# Patient Record
Sex: FEMALE | Race: WHITE | NOT HISPANIC OR LATINO | URBAN - METROPOLITAN AREA
[De-identification: names, ages, dates, MRNs, and addresses within clinical notes are randomized per-mention and may not be internally consistent; named-entity substitution may affect disease eponyms.]

---

## 2018-03-20 ENCOUNTER — OFFICE (OUTPATIENT)
Dept: URBAN - METROPOLITAN AREA CLINIC 33 | Facility: CLINIC | Age: 53
End: 2018-03-20

## 2018-03-20 VITALS
HEIGHT: 60 IN | WEIGHT: 219 LBS | DIASTOLIC BLOOD PRESSURE: 75 MMHG | SYSTOLIC BLOOD PRESSURE: 130 MMHG | HEART RATE: 89 BPM | TEMPERATURE: 97.2 F

## 2018-03-20 DIAGNOSIS — Z98.84 BARIATRIC SURGERY STATUS: ICD-10-CM

## 2018-03-20 DIAGNOSIS — K59.09 OTHER CONSTIPATION: ICD-10-CM

## 2018-03-20 DIAGNOSIS — K62.5 HEMORRHAGE OF ANUS AND RECTUM: ICD-10-CM

## 2018-03-20 PROCEDURE — 99244 OFF/OP CNSLTJ NEW/EST MOD 40: CPT

## 2018-03-20 NOTE — SERVICEHPINOTES
CHATA VALDIVIA   is a   52   year old    female who is being seen in consultation at the request of   PARAG LIMA   for first time colonoscopy and intermittent rectal bleeding. Pt reports bright red bleeding and seeing clots about 1x/month. She notes painful sensation that burns and comes on with constipation but resolved right after defecation. Has BMs 1-2x/day, BSS type 1 to 7, and tends to be "thin and flat" going on for past year. Denies chest pain, SOB, weight loss, decrease in appetite, melena.  Pt had Lap Band at [facility] 2010 and lost 10 lbs after procedure which she gained back eventually. She reports no complications with the Lap Band, but has noticed that spicy foods upset her stomach more than prior to this surgery. Denies reflux, heart burn, chest pain, cough, dysphagia, vomiting, nausea. She has hx iron deficiency followed by PCP Dr Lima. Pt uses NSAIDs rarely for lower back pain or headaches prn. No tobacco, ETOH, drug use. No prior colonoscopyBROSA tx with CPAP and DM controlled with Metformin. BRNo hx cardiovascular diseasesBRNo fm hx colon cancer

## 2018-04-26 ENCOUNTER — OFFICE (OUTPATIENT)
Dept: URBAN - METROPOLITAN AREA PATHOLOGY 17 | Facility: PATHOLOGY | Age: 53
End: 2018-04-26
Payer: COMMERCIAL

## 2018-04-26 ENCOUNTER — OFFICE (OUTPATIENT)
Dept: URBAN - METROPOLITAN AREA CLINIC 32 | Facility: CLINIC | Age: 53
End: 2018-04-26
Payer: COMMERCIAL

## 2018-04-26 VITALS
RESPIRATION RATE: 18 BRPM | SYSTOLIC BLOOD PRESSURE: 167 MMHG | SYSTOLIC BLOOD PRESSURE: 150 MMHG | SYSTOLIC BLOOD PRESSURE: 147 MMHG | OXYGEN SATURATION: 97 % | RESPIRATION RATE: 17 BRPM | DIASTOLIC BLOOD PRESSURE: 101 MMHG | HEART RATE: 107 BPM | RESPIRATION RATE: 15 BRPM | HEART RATE: 93 BPM | OXYGEN SATURATION: 98 % | HEART RATE: 98 BPM | RESPIRATION RATE: 16 BRPM | HEART RATE: 101 BPM | TEMPERATURE: 98.1 F | SYSTOLIC BLOOD PRESSURE: 160 MMHG | SYSTOLIC BLOOD PRESSURE: 183 MMHG | DIASTOLIC BLOOD PRESSURE: 92 MMHG | DIASTOLIC BLOOD PRESSURE: 96 MMHG | HEIGHT: 60 IN | OXYGEN SATURATION: 100 % | HEART RATE: 87 BPM | WEIGHT: 219 LBS | RESPIRATION RATE: 23 BRPM | DIASTOLIC BLOOD PRESSURE: 81 MMHG | TEMPERATURE: 97.7 F | DIASTOLIC BLOOD PRESSURE: 82 MMHG | SYSTOLIC BLOOD PRESSURE: 166 MMHG | SYSTOLIC BLOOD PRESSURE: 144 MMHG | HEART RATE: 90 BPM | RESPIRATION RATE: 14 BRPM | DIASTOLIC BLOOD PRESSURE: 104 MMHG | HEART RATE: 89 BPM | DIASTOLIC BLOOD PRESSURE: 97 MMHG | OXYGEN SATURATION: 99 % | SYSTOLIC BLOOD PRESSURE: 163 MMHG

## 2018-04-26 DIAGNOSIS — D12.2 BENIGN NEOPLASM OF ASCENDING COLON: ICD-10-CM

## 2018-04-26 DIAGNOSIS — Z12.11 ENCOUNTER FOR SCREENING FOR MALIGNANT NEOPLASM OF COLON: ICD-10-CM

## 2018-04-26 PROBLEM — K64.8 OTHER HEMORRHOIDS: Status: ACTIVE | Noted: 2018-04-26

## 2018-04-26 PROBLEM — K64.4 RESIDUAL HEMORRHOIDAL SKIN TAGS: Status: ACTIVE | Noted: 2018-04-26

## 2018-04-26 PROBLEM — D12.0 BENIGN NEOPLASM OF CECUM: Status: ACTIVE | Noted: 2018-04-26

## 2018-04-26 PROBLEM — K57.30 DVRTCLOS OF LG INT W/O PERFORATION OR ABSCESS W/O BLEEDING: Status: ACTIVE | Noted: 2018-04-26

## 2018-04-26 PROCEDURE — 88305 TISSUE EXAM BY PATHOLOGIST: CPT

## 2018-04-26 RX ADMIN — LIDOCAINE HYDROCHLORIDE 60 MG: 20 INJECTION, SOLUTION INFILTRATION; PERINEURAL at 09:46

## 2022-01-24 ENCOUNTER — NEW PATIENT (OUTPATIENT)
Age: 57
End: 2022-01-24

## 2022-01-24 DIAGNOSIS — Z96.1: ICD-10-CM

## 2022-01-24 DIAGNOSIS — H53.032: ICD-10-CM

## 2022-01-24 DIAGNOSIS — H26.493: ICD-10-CM

## 2022-01-24 DIAGNOSIS — H50.00: ICD-10-CM

## 2022-01-24 DIAGNOSIS — Z98.890: ICD-10-CM

## 2022-01-24 PROCEDURE — 99204 OFFICE O/P NEW MOD 45 MIN: CPT

## 2022-01-24 ASSESSMENT — KERATOMETRY
OS_AXISANGLE_DEGREES: 179
OS_AXISANGLE2_DEGREES: 89
OD_K1POWER_DIOPTERS: 46.50
OD_AXISANGLE2_DEGREES: 111
OS_K1POWER_DIOPTERS: 46.50
OD_K2POWER_DIOPTERS: 47.50
OS_K2POWER_DIOPTERS: 47.75
OD_AXISANGLE_DEGREES: 21

## 2022-01-24 ASSESSMENT — VISUAL ACUITY
OD_SC: 20/30-2
OD_GLARE: 20/40
OS_PH: 20/60+2
OS_GLARE: 20/200
OS_SC: 20/70-1

## 2022-01-24 ASSESSMENT — TONOMETRY
OD_IOP_MMHG: 17
OS_IOP_MMHG: 16

## 2022-03-01 ENCOUNTER — CLINIC PROCEDURE ONLY (OUTPATIENT)
Age: 57
End: 2022-03-01

## 2022-03-01 DIAGNOSIS — H26.493: ICD-10-CM

## 2022-03-01 PROCEDURE — 66821 AFTER CATARACT LASER SURGERY: CPT

## 2022-03-01 ASSESSMENT — KERATOMETRY
OS_AXISANGLE2_DEGREES: 89
OS_AXISANGLE_DEGREES: 179
OD_K1POWER_DIOPTERS: 46.50
OD_AXISANGLE_DEGREES: 21
OS_K1POWER_DIOPTERS: 46.50
OD_K2POWER_DIOPTERS: 47.50
OD_AXISANGLE2_DEGREES: 111
OS_K2POWER_DIOPTERS: 47.75

## 2022-03-02 ENCOUNTER — EMERGENCY ADD-ON (OUTPATIENT)
Age: 57
End: 2022-03-02

## 2022-03-02 DIAGNOSIS — Z96.1: ICD-10-CM

## 2022-03-02 PROCEDURE — 99024 POSTOP FOLLOW-UP VISIT: CPT

## 2022-03-02 ASSESSMENT — TONOMETRY
OD_IOP_MMHG: 19
OS_IOP_MMHG: 18

## 2022-03-02 ASSESSMENT — VISUAL ACUITY
OS_SC: 20/25
OD_SC: 20/40+2

## 2022-03-09 ENCOUNTER — PREPPED CHART (OUTPATIENT)
Dept: URBAN - METROPOLITAN AREA CLINIC 64 | Facility: CLINIC | Age: 57
End: 2022-03-09

## 2022-03-09 PROBLEM — H43.811 POSTERIOR VITREOUS DETACHMENT: Status: STABILIZING | Noted: 2022-03-09

## 2022-03-09 PROBLEM — H35.373 EPIRETINAL MEMBRANE: Noted: 2022-03-09

## 2022-03-09 PROBLEM — H26.491 PCO NOT OBSCURING VISION: Noted: 2022-03-09

## 2022-03-09 PROBLEM — E11.9 DIABETES, TYPE II, NO OCULAR COMPLICATIONS: Noted: 2022-03-09

## 2022-03-09 PROBLEM — H26.491 POSTERIOR CAPSULAR HAZE: Noted: 2022-03-09

## 2022-03-09 PROBLEM — H35.373 EPIRETINAL MEMBRANE: Status: STABILIZING | Noted: 2022-03-09

## 2022-03-09 PROBLEM — Z98.890 S/P VITREO-RETINAL SURGERY: Noted: 2022-03-09

## 2022-03-09 PROBLEM — H35.342 LAMELLAR MACULAR HOLE: Noted: 2022-03-09

## 2022-03-09 PROBLEM — H43.811 POSTERIOR VITREOUS DETACHMENT: Noted: 2022-03-09

## 2022-03-09 PROBLEM — H33.311 RETINAL TEAR: Status: WELL-CONTROLLED | Noted: 2022-03-09

## 2022-03-09 PROBLEM — H35.411 LATTICE DEGENERATION OF RETINA: Status: STABILIZING | Noted: 2022-03-09

## 2022-03-09 PROBLEM — H33.311 RETINAL TEAR: Noted: 2022-03-09

## 2022-03-09 PROBLEM — Z86.69 PERSONAL HISTORY DISORDERS OF NERVOUS SYSTEM AND SENSORY ORGANS: Noted: 2022-03-09

## 2022-03-09 PROBLEM — H35.411 LATTICE DEGENERATION OF RETINA: Noted: 2022-03-09

## 2022-04-09 ASSESSMENT — TONOMETRY: OD_IOP_MMHG: 15

## 2022-04-09 ASSESSMENT — VISUAL ACUITY
OS_SC: 20/20-2
OD_SC: 20/30-2

## 2022-04-13 ENCOUNTER — FOLLOW UP (OUTPATIENT)
Dept: URBAN - METROPOLITAN AREA CLINIC 64 | Facility: CLINIC | Age: 57
End: 2022-04-13

## 2022-04-13 DIAGNOSIS — H33.311: ICD-10-CM

## 2022-04-13 DIAGNOSIS — H35.411: ICD-10-CM

## 2022-04-13 DIAGNOSIS — E11.9: ICD-10-CM

## 2022-04-13 DIAGNOSIS — H43.811: ICD-10-CM

## 2022-04-13 DIAGNOSIS — H26.491: ICD-10-CM

## 2022-04-13 DIAGNOSIS — H35.373: ICD-10-CM

## 2022-04-13 PROCEDURE — 92134 CPTRZ OPH DX IMG PST SGM RTA: CPT

## 2022-04-13 PROCEDURE — 92201 OPSCPY EXTND RTA DRAW UNI/BI: CPT

## 2022-04-13 PROCEDURE — 92014 COMPRE OPH EXAM EST PT 1/>: CPT

## 2022-04-13 ASSESSMENT — VISUAL ACUITY
OS_SC: 20/25-1
OD_SC: 20/30

## 2022-04-13 ASSESSMENT — TONOMETRY
OS_IOP_MMHG: 20
OD_IOP_MMHG: 20

## 2022-08-24 ENCOUNTER — FOLLOW UP (OUTPATIENT)
Dept: URBAN - METROPOLITAN AREA CLINIC 64 | Facility: CLINIC | Age: 57
End: 2022-08-24

## 2022-08-24 DIAGNOSIS — E11.9: ICD-10-CM

## 2022-08-24 DIAGNOSIS — H26.491: ICD-10-CM

## 2022-08-24 DIAGNOSIS — H35.411: ICD-10-CM

## 2022-08-24 DIAGNOSIS — H33.311: ICD-10-CM

## 2022-08-24 DIAGNOSIS — H35.373: ICD-10-CM

## 2022-08-24 DIAGNOSIS — H43.811: ICD-10-CM

## 2022-08-24 PROCEDURE — 92014 COMPRE OPH EXAM EST PT 1/>: CPT

## 2022-08-24 PROCEDURE — 92134 CPTRZ OPH DX IMG PST SGM RTA: CPT

## 2022-08-24 PROCEDURE — 92201 OPSCPY EXTND RTA DRAW UNI/BI: CPT

## 2022-08-24 ASSESSMENT — VISUAL ACUITY
OD_SC: 20/30-2
OS_SC: 20/25-3

## 2022-08-24 ASSESSMENT — TONOMETRY
OD_IOP_MMHG: 17
OS_IOP_MMHG: 18

## 2022-09-16 ENCOUNTER — PROCEDURE ONLY (OUTPATIENT)
Dept: URBAN - METROPOLITAN AREA CLINIC 71 | Facility: CLINIC | Age: 57
End: 2022-09-16

## 2022-09-16 DIAGNOSIS — H26.491: ICD-10-CM

## 2022-09-16 PROCEDURE — 66821 AFTER CATARACT LASER SURGERY: CPT

## 2022-09-16 ASSESSMENT — VISUAL ACUITY
OD_GLARE: 20/100
OD_SC: 20/40

## 2024-02-29 ENCOUNTER — ESTABLISHED COMPREHENSIVE EXAM (OUTPATIENT)
Dept: URBAN - METROPOLITAN AREA CLINIC 71 | Facility: CLINIC | Age: 59
End: 2024-02-29

## 2024-02-29 DIAGNOSIS — H35.411: ICD-10-CM

## 2024-02-29 DIAGNOSIS — H43.811: ICD-10-CM

## 2024-02-29 DIAGNOSIS — H40.013: ICD-10-CM

## 2024-02-29 DIAGNOSIS — H53.032: ICD-10-CM

## 2024-02-29 DIAGNOSIS — Z98.890: ICD-10-CM

## 2024-02-29 DIAGNOSIS — H35.373: ICD-10-CM

## 2024-02-29 DIAGNOSIS — E11.9: ICD-10-CM

## 2024-02-29 DIAGNOSIS — H50.00: ICD-10-CM

## 2024-02-29 DIAGNOSIS — H33.311: ICD-10-CM

## 2024-02-29 DIAGNOSIS — Z96.1: ICD-10-CM

## 2024-02-29 DIAGNOSIS — H59.812: ICD-10-CM

## 2024-02-29 PROCEDURE — 76514 ECHO EXAM OF EYE THICKNESS: CPT

## 2024-02-29 PROCEDURE — 92134 CPTRZ OPH DX IMG PST SGM RTA: CPT

## 2024-02-29 PROCEDURE — 92014 COMPRE OPH EXAM EST PT 1/>: CPT

## 2024-02-29 ASSESSMENT — PACHYMETRY
OS_CT_UM: 574
OD_CT_UM: 551

## 2024-02-29 ASSESSMENT — VISUAL ACUITY
OD_SC: 20/20-2
OS_SC: 20/20-2

## 2025-02-14 ENCOUNTER — ESTABLISHED COMPREHENSIVE EXAM (OUTPATIENT)
Dept: URBAN - METROPOLITAN AREA CLINIC 71 | Facility: CLINIC | Age: 60
End: 2025-02-14

## 2025-02-14 DIAGNOSIS — H40.013: ICD-10-CM

## 2025-02-14 DIAGNOSIS — H52.4: ICD-10-CM

## 2025-02-14 DIAGNOSIS — H59.812: ICD-10-CM

## 2025-02-14 DIAGNOSIS — E11.9: ICD-10-CM

## 2025-02-14 DIAGNOSIS — H43.811: ICD-10-CM

## 2025-02-14 DIAGNOSIS — H35.411: ICD-10-CM

## 2025-02-14 DIAGNOSIS — H35.373: ICD-10-CM

## 2025-02-14 PROCEDURE — 92133 CPTRZD OPH DX IMG PST SGM ON: CPT

## 2025-02-14 PROCEDURE — 92014 COMPRE OPH EXAM EST PT 1/>: CPT

## 2025-02-14 PROCEDURE — 92015 DETERMINE REFRACTIVE STATE: CPT

## 2025-02-14 ASSESSMENT — VISUAL ACUITY
OS_SC: 20/20-2
OD_SC: 20/20

## 2025-02-14 ASSESSMENT — TONOMETRY
OS_IOP_MMHG: 18
OD_IOP_MMHG: 18

## 2025-03-07 ENCOUNTER — IOP CHECK (OUTPATIENT)
Dept: URBAN - METROPOLITAN AREA CLINIC 71 | Facility: CLINIC | Age: 60
End: 2025-03-07

## 2025-03-07 DIAGNOSIS — H40.013: ICD-10-CM

## 2025-03-07 PROCEDURE — 99213 OFFICE O/P EST LOW 20 MIN: CPT

## 2025-03-07 PROCEDURE — 92083 EXTENDED VISUAL FIELD XM: CPT

## 2025-03-07 ASSESSMENT — TONOMETRY
OS_IOP_MMHG: 17
OD_IOP_MMHG: 16

## 2025-03-07 ASSESSMENT — VISUAL ACUITY
OD_SC: 20/20-1
OS_SC: 20/20

## 2025-07-24 ENCOUNTER — FOLLOW UP (OUTPATIENT)
Dept: URBAN - METROPOLITAN AREA CLINIC 64 | Facility: CLINIC | Age: 60
End: 2025-07-24

## 2025-07-24 DIAGNOSIS — E11.9: ICD-10-CM

## 2025-07-24 DIAGNOSIS — H59.812: ICD-10-CM

## 2025-07-24 DIAGNOSIS — H43.811: ICD-10-CM

## 2025-07-24 DIAGNOSIS — H35.373: ICD-10-CM

## 2025-07-24 DIAGNOSIS — H35.411: ICD-10-CM

## 2025-07-24 PROCEDURE — 92014 COMPRE OPH EXAM EST PT 1/>: CPT

## 2025-07-24 PROCEDURE — 92134 CPTRZ OPH DX IMG PST SGM RTA: CPT

## 2025-07-24 PROCEDURE — 92202 OPSCPY EXTND ON/MAC DRAW: CPT

## 2025-07-24 ASSESSMENT — VISUAL ACUITY
OS_SC: 20/20
OD_SC: 20/25+2

## 2025-07-24 ASSESSMENT — TONOMETRY
OD_IOP_MMHG: 16
OS_IOP_MMHG: 16